# Patient Record
Sex: MALE | Race: BLACK OR AFRICAN AMERICAN
[De-identification: names, ages, dates, MRNs, and addresses within clinical notes are randomized per-mention and may not be internally consistent; named-entity substitution may affect disease eponyms.]

---

## 2023-04-10 PROBLEM — Z00.00 ENCOUNTER FOR PREVENTIVE HEALTH EXAMINATION: Status: ACTIVE | Noted: 2023-04-10

## 2023-04-11 ENCOUNTER — APPOINTMENT (OUTPATIENT)
Dept: PULMONOLOGY | Facility: CLINIC | Age: 53
End: 2023-04-11
Payer: MEDICAID

## 2023-04-11 VITALS
DIASTOLIC BLOOD PRESSURE: 68 MMHG | HEIGHT: 72 IN | BODY MASS INDEX: 28.71 KG/M2 | SYSTOLIC BLOOD PRESSURE: 138 MMHG | WEIGHT: 212 LBS | RESPIRATION RATE: 16 BRPM | HEART RATE: 111 BPM | OXYGEN SATURATION: 96 %

## 2023-04-11 DIAGNOSIS — R93.89 ABNORMAL FINDINGS ON DIAGNOSTIC IMAGING OF OTHER SPECIFIED BODY STRUCTURES: ICD-10-CM

## 2023-04-11 PROCEDURE — 99203 OFFICE O/P NEW LOW 30 MIN: CPT

## 2023-04-11 NOTE — HISTORY OF PRESENT ILLNESS
[TextBox_4] : Healthy [place] man evaluated for abnormal CXR 11/28/22 at the time of a flu-like illness\par No fever, cough, sputum, sweats, orthopnea, edema or dyspnea\par Runs without problem\par Active\par No medical issues\par TB as a teenager in [place] treated with multiple drug regiment for at least 6 months

## 2023-04-11 NOTE — DISCUSSION/SUMMARY
[FreeTextEntry1] : Healthy male\par Chest x-ray findings consistent with treated Tuberculosis\par Mid lung field micronodularity in the setting of a viral illness is not concerning at all\par The patient is clinically and radiographically well from a pulmonary perspective\par No further QUISPE or Rx is indicated

## 2023-04-11 NOTE — PHYSICAL EXAM
[No Acute Distress] : no acute distress [Normal Oropharynx] : normal oropharynx [Normal Appearance] : normal appearance [No Neck Mass] : no neck mass [Normal Rate/Rhythm] : normal rate/rhythm [Normal S1, S2] : normal s1, s2 [No Resp Distress] : no resp distress [Clear to Auscultation Bilaterally] : clear to auscultation bilaterally [No Clubbing] : no clubbing [No Cyanosis] : no cyanosis [No Edema] : no edema